# Patient Record
Sex: FEMALE | Race: WHITE | Employment: UNEMPLOYED | URBAN - METROPOLITAN AREA
[De-identification: names, ages, dates, MRNs, and addresses within clinical notes are randomized per-mention and may not be internally consistent; named-entity substitution may affect disease eponyms.]

---

## 2023-02-03 ENCOUNTER — HOSPITAL ENCOUNTER (EMERGENCY)
Age: 58
Discharge: STILL ADMITTED OR ADMINISTRATIVE | End: 2023-02-03
Attending: EMERGENCY MEDICINE

## 2023-02-03 VITALS
BODY MASS INDEX: 44.41 KG/M2 | WEIGHT: 293 LBS | TEMPERATURE: 98.6 F | OXYGEN SATURATION: 98 % | SYSTOLIC BLOOD PRESSURE: 150 MMHG | HEIGHT: 68 IN | RESPIRATION RATE: 18 BRPM | HEART RATE: 97 BPM | DIASTOLIC BLOOD PRESSURE: 90 MMHG

## 2023-02-03 RX ORDER — NYSTATIN 100000 [USP'U]/G
POWDER TOPICAL
COMMUNITY
Start: 2022-04-14

## 2023-02-03 RX ORDER — CETIRIZINE HYDROCHLORIDE 10 MG/1
10 TABLET ORAL
COMMUNITY
Start: 2023-01-24 | End: 2023-04-24

## 2023-02-03 RX ORDER — TELMISARTAN AND HYDROCHLORTHIAZIDE 80; 12.5 MG/1; MG/1
TABLET ORAL
COMMUNITY
Start: 2023-01-24 | End: 2023-04-24

## 2023-02-03 RX ORDER — SERTRALINE HYDROCHLORIDE 50 MG/1
1 TABLET, FILM COATED ORAL DAILY
COMMUNITY
Start: 2023-01-24 | End: 2023-04-24

## 2023-02-03 NOTE — ED TRIAGE NOTES
Pt reports being exposed to a rabid cat, with no injury. [facility] health department called and recommended to come in and get a rabbis series.

## 2023-05-25 RX ORDER — NYSTATIN 100000 [USP'U]/G
POWDER TOPICAL
COMMUNITY
Start: 2022-04-14

## 2025-07-25 ENCOUNTER — HOSPITAL ENCOUNTER (EMERGENCY)
Facility: HOSPITAL | Age: 60
Discharge: HOME | End: 2025-07-25
Attending: EMERGENCY MEDICINE
Payer: OTHER GOVERNMENT

## 2025-07-25 ENCOUNTER — APPOINTMENT (OUTPATIENT)
Facility: HOSPITAL | Age: 60
End: 2025-07-25
Payer: OTHER GOVERNMENT

## 2025-07-25 VITALS
BODY MASS INDEX: 44.41 KG/M2 | RESPIRATION RATE: 18 BRPM | OXYGEN SATURATION: 99 % | DIASTOLIC BLOOD PRESSURE: 77 MMHG | HEART RATE: 97 BPM | SYSTOLIC BLOOD PRESSURE: 157 MMHG | HEIGHT: 68 IN | WEIGHT: 293 LBS | TEMPERATURE: 98.4 F

## 2025-07-25 DIAGNOSIS — S82.54XA NONDISPLACED FRACTURE OF MEDIAL MALLEOLUS OF RIGHT TIBIA, INITIAL ENCOUNTER FOR CLOSED FRACTURE: ICD-10-CM

## 2025-07-25 DIAGNOSIS — S92.244A NONDISPLACED FRACTURE OF MEDIAL CUNEIFORM OF RIGHT FOOT, INITIAL ENCOUNTER FOR CLOSED FRACTURE: Primary | ICD-10-CM

## 2025-07-25 DIAGNOSIS — S82.434A NONDISPLACED OBLIQUE FRACTURE OF SHAFT OF RIGHT FIBULA, INITIAL ENCOUNTER FOR CLOSED FRACTURE: ICD-10-CM

## 2025-07-25 PROCEDURE — 73562 X-RAY EXAM OF KNEE 3: CPT

## 2025-07-25 PROCEDURE — 73630 X-RAY EXAM OF FOOT: CPT

## 2025-07-25 PROCEDURE — 99283 EMERGENCY DEPT VISIT LOW MDM: CPT

## 2025-07-25 PROCEDURE — 6370000000 HC RX 637 (ALT 250 FOR IP): Performed by: NURSE PRACTITIONER

## 2025-07-25 PROCEDURE — 73610 X-RAY EXAM OF ANKLE: CPT

## 2025-07-25 PROCEDURE — 29515 APPLICATION SHORT LEG SPLINT: CPT

## 2025-07-25 RX ORDER — ACETAMINOPHEN 500 MG
500 TABLET ORAL EVERY 6 HOURS PRN
Qty: 20 TABLET | Refills: 0 | Status: SHIPPED | OUTPATIENT
Start: 2025-07-25 | End: 2025-08-01

## 2025-07-25 RX ORDER — IBUPROFEN 600 MG/1
600 TABLET, FILM COATED ORAL EVERY 6 HOURS PRN
Qty: 28 TABLET | Refills: 0 | Status: SHIPPED | OUTPATIENT
Start: 2025-07-25 | End: 2025-08-01

## 2025-07-25 RX ORDER — IBUPROFEN 400 MG/1
400 TABLET, FILM COATED ORAL ONCE
Status: COMPLETED | OUTPATIENT
Start: 2025-07-25 | End: 2025-07-25

## 2025-07-25 RX ADMIN — IBUPROFEN 400 MG: 400 TABLET ORAL at 16:45

## 2025-07-25 ASSESSMENT — LIFESTYLE VARIABLES
HOW MANY STANDARD DRINKS CONTAINING ALCOHOL DO YOU HAVE ON A TYPICAL DAY: PATIENT DOES NOT DRINK
HOW OFTEN DO YOU HAVE A DRINK CONTAINING ALCOHOL: NEVER

## 2025-07-25 ASSESSMENT — PAIN - FUNCTIONAL ASSESSMENT: PAIN_FUNCTIONAL_ASSESSMENT: 0-10

## 2025-07-25 ASSESSMENT — PAIN SCALES - GENERAL: PAINLEVEL_OUTOF10: 4

## 2025-07-25 ASSESSMENT — PAIN DESCRIPTION - ORIENTATION: ORIENTATION: LEFT

## 2025-07-25 ASSESSMENT — PAIN DESCRIPTION - LOCATION: LOCATION: KNEE

## 2025-07-25 NOTE — ED NOTES
Ortho-glass lower-leg with stirrup splint applied to right leg/ankle per verbal order from MD Fox.

## 2025-07-25 NOTE — ED PROVIDER NOTES
[facility] EMERGENCY DEPT  EMERGENCY DEPARTMENT HISTORY AND PHYSICAL EXAM      Date of evaluation: 7/25/2025  Patient Name: Shilpa Amador  Birthdate 1965  MRN: [id number]  ED Provider: Anthony Braxton DO   Note Started: 1:38 PM EDT 7/25/25    HISTORY OF PRESENT ILLNESS     Chief Complaint   Patient presents with    Fall     History Provided By: patient    HPI: 70-year-old female with past medical history of obesity and hypertension who presents with fall.  She missed a step when walking outside.  She fell down about 2 steps.  She injured her right ankle and landed on her left knee.  She did not hit her head.  No other injuries.  She has pain to her right ankle and foot as well as pain to her left knee.    PAST MEDICAL HISTORY   Past Medical History:  Past Medical History:   Diagnosis Date    Hypertension        Past Surgical History:  No past surgical history on file.    Family History:  No family history on file.    Social History:  Social History     Tobacco Use    Smoking status: Former    Smokeless tobacco: Never   Substance Use Topics    Alcohol use: Never    Drug use: Never       Allergies:  Not on File    PCP: Clarisse Kang APRN - CNP    Current Meds:   No current facility-administered medications for this encounter.     Current Outpatient Medications   Medication Sig Dispense Refill    ibuprofen (IBU) 600 MG tablet Take 1 tablet by mouth every 6 hours as needed for Pain 28 tablet 0    acetaminophen (TYLENOL) 500 MG tablet Take 1 tablet by mouth every 6 hours as needed for Pain 20 tablet 0    nystatin (MYCOSTATIN) 026319 UNIT/GM powder ceived the following from [facility]: Outside name: nystatin (Nystop) powder         Social Determinants of Health:   Social Drivers of Health     Tobacco Use: Medium Risk (2/3/2023)    Received from [facility]  (prior to 6/17/2023)    Patient History     Smoking Tobacco Use: Former     Smokeless Tobacco Use: Never     Passive Exposure: Not

## 2025-07-25 NOTE — DISCHARGE INSTRUCTIONS
Thank you for choosing our Emergency Department for your care.  It is our privilege to care for you in your time of need.  In the next several days, you may receive a survey via email or mailed to your home about your experience with our team.  We would greatly appreciate you taking a few minutes to complete the survey, as we use this information to learn what we have done well and what we could be doing better. Thank you for trusting us with your care!    Below you will find a list of your tests from today's visit.   Labs and Radiology Studies  No results found for this or any previous visit (from the past 12 hours).  XR FOOT RIGHT (MIN 3 VIEWS)  Result Date: 7/25/2025  EXAM: XR KNEE LEFT (3 VIEWS), XR ANKLE RIGHT (MIN 3 VIEWS), XR FOOT RIGHT (MIN 3 VIEWS) INDICATION: fall. Left knee pain. Right foot and ankle pain COMPARISON: None. FINDINGS: Three views of the left knee demonstrate no fracture. There is some surface irregularity of the posterior patella. No significant joint effusion. 3 views of the right foot demonstrate a comminuted fracture of the distal aspect of the medial cuneiform that extends to the articular surface. Three views of the right ankle demonstrate an oblique fracture through the distal fibula, nondisplaced. There is marked lateral soft tissue swelling as well as a joint effusion. In addition, there is a moderate plantar spur. Chip avulsion from the medial malleolus and borderline ankle mortise.     Left chondromalacia patella Acute intra-articular fracture of the right distal cuneiform Acute oblique fracture of the right distal fibula, chip avulsion from the medial malleolus, with joint effusion and borderline ankle mortise. Electronically signed by Aubree Freeman    XR ANKLE RIGHT (MIN 3 VIEWS)  Result Date: 7/25/2025  EXAM: XR KNEE LEFT (3 VIEWS), XR ANKLE RIGHT (MIN 3 VIEWS), XR FOOT RIGHT (MIN 3 VIEWS) INDICATION: fall. Left knee pain. Right foot and ankle pain COMPARISON: None. FINDINGS: